# Patient Record
(demographics unavailable — no encounter records)

---

## 2025-01-14 NOTE — ASSESSMENT
[FreeTextEntry1] : In summary the patient has bleeding internal hemorrhoids.  Rubber band ligations were performed by another colorectal surgeon several years ago.  The patient noticed improvement in his symptoms but has had recurrent bleeding and anemia.  Rubber band ligations were again performed in the office today.  I will see him in 6 weeks and repeat if necessary.  He will follow-up with  for colonoscopy later this spring

## 2025-01-14 NOTE — PHYSICAL EXAM
[Normal Breath Sounds] : Normal breath sounds [Normal Heart Sounds] : normal heart sounds [Alert] : alert [Oriented to Person] : oriented to person [Oriented to Place] : oriented to place [Oriented to Time] : oriented to time [Calm] : calm [Abdomen Tenderness] : ~T No ~M abdominal tenderness [de-identified] : Perianal inspection and digital rectal examination are normal.  Anoscopy reveals friable circumferential enlarged internal hemorrhoids largest in the right posterior and left lateral positions.  The left lateral bundle was showing signs of recent bleeding.  After the risks benefits and alternatives including the rare complications of bleeding and infection were explained rubber band ligations were performed in the right posterior and left lateral positions.  He tolerated the procedure well and a post procedure instruction sheet was given. [de-identified] : WNL [de-identified] : WNL [de-identified] : OMARL [de-identified] : WNL ROM [de-identified] : WNL

## 2025-01-14 NOTE — CONSULT LETTER
[Dear  ___] : Dear  [unfilled], [Consult Letter:] : I had the pleasure of evaluating your patient, [unfilled]. [Please see my note below.] : Please see my note below. [Consult Closing:] : Thank you very much for allowing me to participate in the care of this patient.  If you have any questions, please do not hesitate to contact me. [Sincerely,] : Sincerely, [FreeTextEntry3] : Yanick Delatorre M.D., F.A.C.S, F.A.S.C.R.S [DrSharmila  ___] : Dr. HARE

## 2025-01-14 NOTE — HISTORY OF PRESENT ILLNESS
[FreeTextEntry1] : Frankie is a 51 y/o male here for a consultation visit, hemorrhoids.   Last colonoscopy was in 202 or 2021 - pt doesn't remember who did it, pt states there was 2 polyps found (benign).   Hx of RBL x2 that did help pt - last time was 2022 (Dr. Serna)  Today pt reports no pain. Daily BMs, formed, no straining, no routine use of fiber supplements/laxatives, denies pain, has bleeding dripping into  bowl that occurs on-and-off (daily for about 2 weeks then none for a month) - hx of anemia - has had 1 unit of PRBC in 2021, has leakage of mucus sometimes with BM. no episodes of incontinence of stool, and denies feeling swollen or prolapsed tissue. Denies use of creams/ointments. Not taking any anticoagulants.

## 2025-05-13 NOTE — PHYSICAL EXAM
[Abdomen Tenderness] : ~T No ~M abdominal tenderness [Wheezing] : no wheezing was heard [Normal Rate and Rhythm] : normal rate and rhythm [No Rash or Lesion] : No rash or lesion [Alert] : alert [Calm] : calm [de-identified] : Perianal inspection reveals a minimally enlarged external hemorrhoid in the left lateral position.  Anoscopy reveals friable circumferential internal hemorrhoids largest in the right posterior and left lateral positions.  Risks benefits and alternatives were explained and rubber band ligations were performed in the right posterior and left lateral positions.  He tolerated the procedure well. [de-identified] : nad [de-identified] : nl

## 2025-05-13 NOTE — PHYSICAL EXAM
[Abdomen Tenderness] : ~T No ~M abdominal tenderness [Wheezing] : no wheezing was heard [Normal Rate and Rhythm] : normal rate and rhythm [No Rash or Lesion] : No rash or lesion [Alert] : alert [Calm] : calm [de-identified] : Perianal inspection reveals a minimally enlarged external hemorrhoid in the left lateral position.  Anoscopy reveals friable circumferential internal hemorrhoids largest in the right posterior and left lateral positions.  Risks benefits and alternatives were explained and rubber band ligations were performed in the right posterior and left lateral positions.  He tolerated the procedure well. [de-identified] : nad [de-identified] : nl

## 2025-05-13 NOTE — HISTORY OF PRESENT ILLNESS
[FreeTextEntry1] : Frankie is a 51 y/o male here for a follow up visit, RBL.   Last colonoscopy was in 2020 or 2021 - pt doesn't remember who did it, pt states there was 2 polyps found (benign).  Hx of RBL x2 that did help pt - last time was 2022 (Dr. Serna)  Last seen on 1/14/25 - In summary the patient has bleeding internal hemorrhoids. Rubber band ligations were performed by another colorectal surgeon several years ago. The patient noticed improvement in his symptoms but has had recurrent bleeding and anemia. Rubber band ligations were again performed in the office today. I will see him in 6 weeks and repeat if necessary. He will follow-up with  for colonoscopy later this spring.   Today pt reports no pain. Daily BMs, formed, no straining, takes fiber gummies, denies pain, has on-and-off bleeding on dripping into bowl - last episode was last week, sometimes has leakage of mucus, no episodes of incontinence, and denies feeling swollen or prolapsed tissue. Not taking any anticoagulants. Last RNL helped pt.

## 2025-05-13 NOTE — ASSESSMENT
[FreeTextEntry1] : In summary the patient has bleeding internal hemorrhoids.  Additional rubber band ligations were performed in the office today.  I performed rubber band ligations on him in January of this year.  He noted symptomatic improvement following this procedure.  I explained that I would recommend additional rubber band ligations if his bleeding recurs.  If he notices no improvement the other option would be a hemorrhoidectomy versus internal hemorrhoid embolization.